# Patient Record
(demographics unavailable — no encounter records)

---

## 2025-04-09 NOTE — HISTORY OF PRESENT ILLNESS
[FreeTextEntry1] : 58 yo male with DM2, HTN, HLD, non-obs CAD (by cath in 2020) here for follow up cardiac care. Previously followed by Dr. Kvng Frazier last seen in 09/2023. Doing well. Denies any significant chest discomfort, palpitations, dyspnea, LE edema, PND or syncope. Attempting to lose weight. Wants to stop all medications - stopped taking crestor, terazosin and glyxambi  Wants to stop taking losartan.  Active plays [employer] (2 hr sessions)- 4 times a week; also plays golf frequently. +FMH of CAD - father had MI in his early 60s;  Never smoker  Cardiac Workup:  01/2024: Tchol 157, HDL 48, LDL 97 01/2024: HbA1C 6.8% Cardiac Cath 10/2020: 30% prox CX EXSE 8/2023: nl lv sys fxn; nl hester fxn; 12:50 min Дмитрий; no ischemia CPET 8/2023: dec RER; 80% predicted peak VO2 TTE: 08/2023: normal LV size/function. LVEF 66%, normal RV size/function, normal aorta size. Trace MR. No pulm HTN

## 2025-04-09 NOTE — ASSESSMENT
[FreeTextEntry1] : 60 yo male with DM2, HTN, HLD, non-obs CAD (by cath in 2020) here for follow up cardiac care. #ASCVD - non-obs CAD on cath in 2020 - ASA if tolerates - recommended continuing statin; labs pending - continued risk factor modification - Cardiovascular risk factors discussed - stress test prior to next visit  #HTN - BP WNL --on losartan for renal protection; recommended to continue --close home monitoring emphasized --low salt diet --exercise and weight loss as tolerated  # Hyperlipidemia -  Sub-optimally Controlled, Aim for a LDL of <70 Recent LDL 97; goal <70 --sub-optimally controlled lipids, recommended to continue statin - Low fat low cholesterol diet - Heart healthy diet emphasized - Exercise as tolerated

## 2025-04-09 NOTE — HISTORY OF PRESENT ILLNESS
[FreeTextEntry1] : 60 yo male with DM2, HTN, HLD, non-obs CAD (by cath in 2020) here for follow up cardiac care. Previously followed by Dr. Kvng Frazier last seen in 09/2023. Doing well. Denies any significant chest discomfort, palpitations, dyspnea, LE edema, PND or syncope. Attempting to lose weight. Wants to stop all medications - stopped taking crestor, terazosin and glyxambi  Wants to stop taking losartan.  Active plays [employer] (2 hr sessions)- 4 times a week; also plays golf frequently. +FMH of CAD - father had MI in his early 60s;  Never smoker  Cardiac Workup:  01/2024: Tchol 157, HDL 48, LDL 97 01/2024: HbA1C 6.8% Cardiac Cath 10/2020: 30% prox CX EXSE 8/2023: nl lv sys fxn; nl hester fxn; 12:50 min Дмитрий; no ischemia CPET 8/2023: dec RER; 80% predicted peak VO2 TTE: 08/2023: normal LV size/function. LVEF 66%, normal RV size/function, normal aorta size. Trace MR. No pulm HTN

## 2025-04-09 NOTE — HISTORY OF PRESENT ILLNESS
[FreeTextEntry1] : 60 yo male with DM2, HTN, HLD, non-obs CAD (by cath in 2020) here for follow up cardiac care. Previously followed by Dr. Kvng Frazier last seen in 09/2023. Doing well. Denies any significant chest discomfort, palpitations, dyspnea, LE edema, PND or syncope. Attempting to lose weight. Wants to stop all medications - stopped taking crestor, terazosin and glyxambi  Wants to stop taking losartan.  Active plays [employer] (2 hr sessions)- 4 times a week; also plays golf frequently. +FMH of CAD - father had MI in his early 60s;  Never smoker  Cardiac Workup:  01/2024: Tchol 157, HDL 48, LDL 97 01/2024: HbA1C 6.8% Cardiac Cath 10/2020: 30% prox CX EXSE 8/2023: nl lv sys fxn; nl hester fxn; 12:50 min Дмитрий; no ischemia CPET 8/2023: dec RER; 80% predicted peak VO2 TTE: 08/2023: normal LV size/function. LVEF 66%, normal RV size/function, normal aorta size. Trace MR. No pulm HTN

## 2025-04-09 NOTE — ASSESSMENT
[FreeTextEntry1] : 58 yo male with DM2, HTN, HLD, non-obs CAD (by cath in 2020) here for follow up cardiac care. #ASCVD - non-obs CAD on cath in 2020 - ASA if tolerates - recommended continuing statin; labs pending - continued risk factor modification - Cardiovascular risk factors discussed - stress test prior to next visit  #HTN - BP WNL --on losartan for renal protection; recommended to continue --close home monitoring emphasized --low salt diet --exercise and weight loss as tolerated  # Hyperlipidemia -  Sub-optimally Controlled, Aim for a LDL of <70 Recent LDL 97; goal <70 --sub-optimally controlled lipids, recommended to continue statin - Low fat low cholesterol diet - Heart healthy diet emphasized - Exercise as tolerated